# Patient Record
Sex: FEMALE | Race: WHITE | NOT HISPANIC OR LATINO | Employment: OTHER | RURAL
[De-identification: names, ages, dates, MRNs, and addresses within clinical notes are randomized per-mention and may not be internally consistent; named-entity substitution may affect disease eponyms.]

---

## 2017-02-10 ENCOUNTER — OFFICE VISIT (OUTPATIENT)
Dept: RETAIL CLINIC | Facility: CLINIC | Age: 52
End: 2017-02-10

## 2017-02-10 VITALS
HEART RATE: 81 BPM | OXYGEN SATURATION: 99 % | WEIGHT: 120.2 LBS | HEIGHT: 65 IN | RESPIRATION RATE: 15 BRPM | BODY MASS INDEX: 20.03 KG/M2 | TEMPERATURE: 99 F

## 2017-02-10 DIAGNOSIS — J01.00 ACUTE NON-RECURRENT MAXILLARY SINUSITIS: Primary | ICD-10-CM

## 2017-02-10 LAB
EXPIRATION DATE: NORMAL
FLUAV AG NPH QL: NEGATIVE
FLUBV AG NPH QL: NEGATIVE
INTERNAL CONTROL: NORMAL
Lab: NORMAL

## 2017-02-10 PROCEDURE — 99213 OFFICE O/P EST LOW 20 MIN: CPT | Performed by: NURSE PRACTITIONER

## 2017-02-10 PROCEDURE — 87804 INFLUENZA ASSAY W/OPTIC: CPT | Performed by: NURSE PRACTITIONER

## 2017-02-10 RX ORDER — PREDNISONE 20 MG/1
20 TABLET ORAL DAILY
Qty: 3 TABLET | Refills: 0 | Status: SHIPPED | OUTPATIENT
Start: 2017-02-10 | End: 2017-02-13

## 2017-02-10 RX ORDER — DEXTROMETHORPHAN HYDROBROMIDE AND PROMETHAZINE HYDROCHLORIDE 15; 6.25 MG/5ML; MG/5ML
5 SYRUP ORAL 4 TIMES DAILY PRN
Qty: 100 ML | Refills: 0 | Status: SHIPPED | OUTPATIENT
Start: 2017-02-10 | End: 2017-02-15

## 2017-02-10 RX ORDER — ALBUTEROL SULFATE 90 UG/1
2 AEROSOL, METERED RESPIRATORY (INHALATION) EVERY 4 HOURS PRN
Qty: 1 INHALER | Refills: 0 | Status: SHIPPED | OUTPATIENT
Start: 2017-02-10 | End: 2018-03-30

## 2017-02-10 RX ORDER — AMOXICILLIN AND CLAVULANATE POTASSIUM 875; 125 MG/1; MG/1
1 TABLET, FILM COATED ORAL 2 TIMES DAILY
Qty: 14 TABLET | Refills: 0 | Status: SHIPPED | OUTPATIENT
Start: 2017-02-10 | End: 2017-02-17

## 2017-02-10 NOTE — PROGRESS NOTES
"Subjective   Kayce Wing is a 51 y.o. female.   Visit Vitals   • Pulse 81   • Temp 99 °F (37.2 °C) (Oral)   • Resp 15   • Ht 64.5\" (163.8 cm)   • Wt 120 lb 3.2 oz (54.5 kg)   • LMP 01/23/2017   • SpO2 99%   • BMI 20.31 kg/m2         Sinusitis   This is a new problem. The current episode started in the past 7 days. The problem has been gradually worsening since onset. Maximum temperature: subjective, did not take. Associated symptoms include congestion, coughing (productive, severe, chest hurts with cough), ear pain (pressure), headaches (mild), a hoarse voice, sinus pressure and a sore throat (mild). Pertinent negatives include no diaphoresis or neck pain.        The following portions of the patient's history were reviewed and updated as appropriate: allergies, current medications, past family history, past medical history, past social history, past surgical history and problem list.    Review of Systems   Constitutional: Positive for appetite change, fatigue and fever. Negative for diaphoresis.   HENT: Positive for congestion, ear pain (pressure), hoarse voice, postnasal drip, rhinorrhea, sinus pressure and sore throat (mild).    Respiratory: Positive for cough (productive, severe, chest hurts with cough).    Gastrointestinal: Negative for diarrhea, nausea and vomiting.   Musculoskeletal: Negative for neck pain.   Neurological: Positive for headaches (mild).     Neg for PMH of diabetes of HTN    Objective   Physical Exam   Constitutional: She appears well-developed and well-nourished. She has a sickly appearance.   HENT:   Head: Normocephalic and atraumatic.   Nose: Rhinorrhea present. Right sinus exhibits maxillary sinus tenderness. Right sinus exhibits no frontal sinus tenderness. Left sinus exhibits maxillary sinus tenderness. Left sinus exhibits no frontal sinus tenderness.   Mouth/Throat: Oropharynx is clear and moist and mucous membranes are normal.   Cardiovascular: Regular rhythm and normal heart sounds. "    Pulmonary/Chest: Effort normal. She has wheezes (trace). She has rhonchi. She has no rales.   Lymphadenopathy:     She has no cervical adenopathy.   Skin: Skin is warm and dry.       Assessment/Plan   Kayce was seen today for sinusitis.    Diagnoses and all orders for this visit:    Acute non-recurrent maxillary sinusitis  -     POC Influenza A / B    Other orders  -     amoxicillin-clavulanate (AUGMENTIN) 875-125 MG per tablet; Take 1 tablet by mouth 2 (Two) Times a Day for 7 days.  -     predniSONE (DELTASONE) 20 MG tablet; Take 1 tablet by mouth Daily for 3 days.  -     albuterol (PROVENTIL HFA;VENTOLIN HFA) 108 (90 BASE) MCG/ACT inhaler; Inhale 2 puffs Every 4 (Four) Hours As Needed for wheezing or shortness of air.  -     promethazine-dextromethorphan (PROMETHAZINE-DM) 6.25-15 MG/5ML syrup; Take 5 mL by mouth 4 (Four) Times a Day As Needed for cough for up to 5 days.        Results for orders placed or performed in visit on 02/10/17   POC Influenza A / B   Result Value Ref Range    Rapid Influenza A Ag NEGATIVE     Rapid Influenza B Ag NEGATIVE     Internal Control Passed Passed    Lot Number [id number]     Expiration Date 4/2018

## 2017-02-10 NOTE — PATIENT INSTRUCTIONS

## 2017-07-21 ENCOUNTER — OFFICE VISIT (OUTPATIENT)
Dept: RETAIL CLINIC | Facility: CLINIC | Age: 52
End: 2017-07-21

## 2017-07-21 VITALS
WEIGHT: 114.2 LBS | BODY MASS INDEX: 19.03 KG/M2 | RESPIRATION RATE: 14 BRPM | HEIGHT: 65 IN | TEMPERATURE: 98.8 F | OXYGEN SATURATION: 98 % | HEART RATE: 59 BPM

## 2017-07-21 DIAGNOSIS — R30.0 DYSURIA: Primary | ICD-10-CM

## 2017-07-21 DIAGNOSIS — N30.01 ACUTE CYSTITIS WITH HEMATURIA: ICD-10-CM

## 2017-07-21 LAB
BILIRUB BLD-MCNC: NEGATIVE MG/DL
CLARITY, POC: CLEAR
COLOR UR: YELLOW
GLUCOSE UR STRIP-MCNC: NEGATIVE MG/DL
KETONES UR QL: NEGATIVE
LEUKOCYTE EST, POC: NEGATIVE
NITRITE UR-MCNC: NEGATIVE MG/ML
PH UR: 6 [PH] (ref 5–8)
PROT UR STRIP-MCNC: NEGATIVE MG/DL
RBC # UR STRIP: ABNORMAL /UL
SP GR UR: 1.01 (ref 1–1.03)
UROBILINOGEN UR QL: NORMAL

## 2017-07-21 PROCEDURE — 99213 OFFICE O/P EST LOW 20 MIN: CPT | Performed by: NURSE PRACTITIONER

## 2017-07-21 PROCEDURE — 81003 URINALYSIS AUTO W/O SCOPE: CPT | Performed by: NURSE PRACTITIONER

## 2017-07-21 RX ORDER — SULFAMETHOXAZOLE AND TRIMETHOPRIM 800; 160 MG/1; MG/1
1 TABLET ORAL 2 TIMES DAILY
Qty: 6 TABLET | Refills: 0 | Status: SHIPPED | OUTPATIENT
Start: 2017-07-21 | End: 2017-07-24

## 2017-07-21 RX ORDER — MULTIPLE VITAMINS W/ MINERALS TAB 9MG-400MCG
1 TAB ORAL DAILY
COMMUNITY
End: 2021-06-14

## 2017-07-21 RX ORDER — MELATONIN
1000 DAILY
COMMUNITY

## 2017-07-21 NOTE — PATIENT INSTRUCTIONS
Urinary Tract Infection, Adult  A urinary tract infection (UTI) is an infection of any part of the urinary tract, which includes the kidneys, ureters, bladder, and urethra. These organs make, store, and get rid of urine in the body. UTI can be a bladder infection (cystitis) or kidney infection (pyelonephritis).  CAUSES  This infection may be caused by fungi, viruses, or bacteria. Bacteria are the most common cause of UTIs. This condition can also be caused by repeated incomplete emptying of the bladder during urination.   RISK FACTORS  This condition is more likely to develop if:   · You ignore your need to urinate or hold urine for long periods of time.  · You do not empty your bladder completely during urination.  · You wipe back to front after urinating or having a bowel movement, if you are female.  · You are uncircumcised, if you are male.    · You are constipated.  · You have a urinary catheter that stays in place (indwelling).  · You have a weak defense (immune) system.  · You have a medical condition that affects your bowels, kidneys, or bladder.  · You have diabetes.  · You take antibiotic medicines frequently or for long periods of time, and the antibiotics no longer work well against certain types of infections (antibiotic resistance).  · You take medicines that irritate your urinary tract.  · You are exposed to chemicals that irritate your urinary tract.  · You are female.  SYMPTOMS   Symptoms of this condition include:   · Fever.    · Frequent urination or passing small amounts of urine frequently.    · Needing to urinate urgently.    · Pain or burning with urination.    · Urine that smells bad or unusual.    · Cloudy urine.    · Pain in the lower abdomen or back.    · Trouble urinating.    · Blood in the urine.    · Vomiting or being less hungry than normal.     · Diarrhea or abdominal pain.    · Vaginal discharge, if you are female.  DIAGNOSIS  This condition is diagnosed with a medical history and  physical exam. You will also need to provide a urine sample to test your urine. Other tests may be done, including:    · Blood tests.    · Sexually transmitted disease (STD) testing.     If you have had more than one UTI, a cystoscopy or imaging studies may be done to determine the cause of the infections.   TREATMENT   Treatment for this condition often includes a combination of two or more of the following:   · Antibiotic medicine.    · Other medicines to treat less common causes of UTI.    · Over-the-counter medicines to treat pain.    · Drinking enough water to stay hydrated.  HOME CARE INSTRUCTIONS  · Take over-the-counter and prescription medicines only as told by your health care provider.  · If you were prescribed an antibiotic, take it as told by your health care provider. Do not stop taking the antibiotic even if you start to feel better.  · Avoid alcohol, caffeine, tea, and carbonated beverages. They can irritate your bladder.  · Drink enough fluid to keep your urine clear or pale yellow.  · Keep all follow-up visits as told by your health care provider. This is important.  · Make sure to:    Empty your bladder often and completely. Do not hold urine for long periods of time.    Empty your bladder before and after sex.    Wipe from front to back after a bowel movement if you are female. Use each tissue one time when you wipe.  SEEK MEDICAL CARE IF:   · You have back pain.    · You have a fever.  · You feel nauseous or vomit.    · Your symptoms do not get better after 3 days.     · Your symptoms go away and then return.  SEEK IMMEDIATE MEDICAL CARE IF:   · You have severe back pain or lower abdominal pain.    · You are vomiting and cannot keep down any medicines or water.     This information is not intended to replace advice given to you by your health care provider. Make sure you discuss any questions you have with your health care provider.     Document Released: 09/27/2006 Document Revised: 04/10/2017  Document Reviewed: 11/07/2016  [employer] Interactive Patient Education ©2017 [employer]

## 2017-07-21 NOTE — PROGRESS NOTES
"Subjective   Kayce Wing is a 51 y.o. female.     History of Present Illness   Ms. Wing comes in with urinary frequency, no hematuria, starting to have some low back pain now but no fever, nausea or vomiting.  She does [facility] [employer] and so spends a lot of time in the water.  No recent diarrhea but did have frequent UTI's as a young adult.      The following portions of the patient's history were reviewed and updated as appropriate: allergies, current medications, past family history, past medical history, past social history, past surgical history and problem list.    Review of Systems   Constitutional: Negative for activity change, appetite change, chills, fatigue and fever.   Gastrointestinal: Negative for abdominal pain, constipation, diarrhea, nausea and vomiting.   Genitourinary: Positive for frequency.   Musculoskeletal: Back pain: mild low.   Psychiatric/Behavioral: Negative for sleep disturbance.       Objective   Physical Exam   Constitutional: She is oriented to person, place, and time. She appears well-developed and well-nourished. No distress.   HENT:   Head: Normocephalic and atraumatic.   Cardiovascular: Normal rate and regular rhythm.    Pulmonary/Chest: Effort normal and breath sounds normal.   Abdominal: Soft. Bowel sounds are normal. She exhibits no distension. There is tenderness (mild supra-pubic pressure, \"feels like I have to pee\").   Neurological: She is alert and oriented to person, place, and time.   Skin: Skin is warm and dry. She is not diaphoretic.   Psychiatric: She has a normal mood and affect. Her behavior is normal.   Nursing note and vitals reviewed.    Results for orders placed or performed in visit on 07/21/17   POC Urinalysis Dipstick, Automated   Result Value Ref Range    Color Yellow Yellow, Straw, Dark Yellow, Genet    Clarity, UA Clear Clear    Glucose, UA Negative Negative, 1000 mg/dL (3+) mg/dL    Bilirubin Negative Negative    Ketones, UA Negative " Negative    Specific Gravity  1.010 1.005 - 1.030    Blood, UA Trace (A) Negative    pH, Urine 6.0 5.0 - 8.0    Protein, POC Negative Negative mg/dL    Urobilinogen, UA Normal Normal    Leukocytes Negative Negative    Nitrite, UA Negative Negative       Assessment/Plan   Kayce was seen today for urinary tract infection.    Diagnoses and all orders for this visit:    Dysuria  -     POC Urinalysis Dipstick, Automated  -     Urine Culture  -     sulfamethoxazole-trimethoprim (BACTRIM DS) 800-160 MG per tablet; Take 1 tablet by mouth 2 (Two) Times a Day for 3 days.    Acute cystitis with hematuria  -     sulfamethoxazole-trimethoprim (BACTRIM DS) 800-160 MG per tablet; Take 1 tablet by mouth 2 (Two) Times a Day for 3 days.  -     Urine Culture - to call with results if any problems.   -     See instuctions    Discharge instructions were reviewed including:  Medications and side effects  Reviewed [facility] Care Plan  To follow up with PCP if symptom are worse or persist  See patient instructions  Patient verbalized understanding and agreed with plan.  DAVE Allan

## 2017-07-23 LAB
BACTERIA UR CULT: NO GROWTH
BACTERIA UR CULT: NORMAL

## 2017-07-25 ENCOUNTER — TELEPHONE (OUTPATIENT)
Dept: RETAIL CLINIC | Facility: CLINIC | Age: 52
End: 2017-07-25

## 2017-07-25 NOTE — TELEPHONE ENCOUNTER
Spoke with pt and informed urine culture was negative.  She reports all symptoms have resolved and denies further questions.

## 2018-03-30 ENCOUNTER — OFFICE VISIT (OUTPATIENT)
Dept: RETAIL CLINIC | Facility: CLINIC | Age: 53
End: 2018-03-30

## 2018-03-30 VITALS
WEIGHT: 120.8 LBS | OXYGEN SATURATION: 99 % | RESPIRATION RATE: 15 BRPM | HEIGHT: 65 IN | HEART RATE: 67 BPM | TEMPERATURE: 97 F | BODY MASS INDEX: 20.12 KG/M2

## 2018-03-30 DIAGNOSIS — J40 BRONCHITIS: Primary | ICD-10-CM

## 2018-03-30 PROCEDURE — 99213 OFFICE O/P EST LOW 20 MIN: CPT | Performed by: NURSE PRACTITIONER

## 2018-03-30 RX ORDER — CEPHALEXIN 500 MG/1
500 CAPSULE ORAL 2 TIMES DAILY
Qty: 20 CAPSULE | Refills: 0 | Status: SHIPPED | OUTPATIENT
Start: 2018-03-30 | End: 2018-04-09

## 2018-03-30 NOTE — PROGRESS NOTES
Chief Complaint  Chief Complaint   Patient presents with   • URI       Subjective   Kayce Wing is a 52 y.o. female.     Pt presents with c/o congestion for 1 week which has gotten worse with increased short of breath, wheezing and some mucus production.  Also c/o cut to left index finger with kitchen knife yesterday and today has mild swelling around it and increased redness.        URI    This is a new problem. The current episode started in the past 7 days. The problem has been gradually worsening. Maximum temperature: has not taken but states felt warm. Associated symptoms include chest pain (c/o pulled muscle due to lifting something), congestion, coughing, headaches, rhinorrhea and wheezing. Pertinent negatives include no ear pain, nausea, sinus pain, sore throat or vomiting. She has tried acetaminophen for the symptoms.        Current Outpatient Prescriptions on File Prior to Visit   Medication Sig Dispense Refill   • cholecalciferol (VITAMIN D3) 1000 UNITS tablet Take 1,000 Units by mouth Daily.     • Multiple Vitamins-Minerals (MULTIVITAMIN WITH MINERALS) tablet tablet Take 1 tablet by mouth Daily.     • [DISCONTINUED] albuterol (PROVENTIL HFA;VENTOLIN HFA) 108 (90 BASE) MCG/ACT inhaler Inhale 2 puffs Every 4 (Four) Hours As Needed for wheezing or shortness of air. 1 inhaler 0     No current facility-administered medications on file prior to visit.        Allergies   Allergen Reactions   • Antihistamines, Chlorpheniramine-Type Dizziness       Past Medical History:   Diagnosis Date   • Arthritis     seasoanl allergies   • Asthma    • MVP (mitral valve prolapse)        Past Surgical History:   Procedure Laterality Date   • DILATION AND CURETTAGE, DIAGNOSTIC / THERAPEUTIC  05/2016    DUB   • MOUTH SURGERY         Family History   Problem Relation Age of Onset   • No Known Problems Mother    • Diabetes Father    • Parkinsonism Father 76   • Diabetes Maternal Grandmother    • Diabetes Maternal Grandfather    •  "Diabetes Paternal Grandmother    • Diabetes Paternal Grandfather        Social History     Social History   • Marital status: Single     Spouse name: N/A   • Number of children: N/A   • Years of education: N/A     Occupational History   • Not on file.     Social History Main Topics   • Smoking status: Never Smoker   • Smokeless tobacco: Not on file   • Alcohol use Not on file   • Drug use: Unknown   • Sexual activity: Not on file     Other Topics Concern   • Not on file     Social History Narrative   • No narrative on file       Review of Systems   Constitutional: Positive for activity change, chills, fatigue and fever (low grade).   HENT: Positive for congestion, postnasal drip and rhinorrhea. Negative for ear pain, facial swelling, sinus pain and sore throat.    Respiratory: Positive for cough, chest tightness, shortness of breath and wheezing.    Cardiovascular: Positive for chest pain (c/o pulled muscle due to lifting something).   Gastrointestinal: Negative for nausea and vomiting.   Musculoskeletal: Positive for back pain.   Allergic/Immunologic: Positive for environmental allergies.   Neurological: Positive for dizziness and headaches.       Pulse 67   Temp 97 °F (36.1 °C) (Temporal Artery )   Resp 15   Ht 163.8 cm (64.5\")   Wt 54.8 kg (120 lb 12.8 oz)   LMP 03/18/2018   SpO2 99%   BMI 20.42 kg/m²     Objective   Physical Exam   Constitutional: She is oriented to person, place, and time. Vital signs are normal. She appears well-nourished. She is cooperative. No distress.   HENT:   Head: Normocephalic and atraumatic.   Right Ear: Tympanic membrane normal.   Left Ear: Tympanic membrane normal.   Nose: Rhinorrhea present.   Mouth/Throat: Uvula is midline, oropharynx is clear and moist and mucous membranes are normal.   Neck: Trachea normal, normal range of motion and phonation normal. Neck supple.   Cardiovascular: Normal rate and regular rhythm.    Pulmonary/Chest: Effort normal. No respiratory distress. " "She has wheezes in the left upper field. She has rhonchi in the right lower field and the left lower field. She has no rales.   Lymphadenopathy:     She has no cervical adenopathy.   Neurological: She is alert and oriented to person, place, and time.   Skin: Skin is warm and dry.   approx 1\" laceration noted to left index finger with good approximation, mild redness surrounding laceration, no drainage or redness extending from site.  No tenderness with palpation.        Results for orders placed or performed in visit on 07/21/17   Urine Culture   Result Value Ref Range    Urine Culture Final report     Result 1 No growth    POC Urinalysis Dipstick, Automated   Result Value Ref Range    Color Yellow Yellow, Straw, Dark Yellow, Genet    Clarity, UA Clear Clear    Glucose, UA Negative Negative, 1000 mg/dL (3+) mg/dL    Bilirubin Negative Negative    Ketones, UA Negative Negative    Specific Gravity  1.010 1.005 - 1.030    Blood, UA Trace (A) Negative    pH, Urine 6.0 5.0 - 8.0    Protein, POC Negative Negative mg/dL    Urobilinogen, UA Normal Normal    Leukocytes Negative Negative    Nitrite, UA Negative Negative         Assessment/Plan   Kayce was seen today for uri.    Diagnoses and all orders for this visit:    Bronchitis    Other orders  -     cephalexin (KEFLEX) 500 MG capsule; Take 1 capsule by mouth 2 (Two) Times a Day for 10 days.          DAVE Coleman    "

## 2018-03-30 NOTE — PATIENT INSTRUCTIONS
Acute Bronchitis, Adult  Acute bronchitis is when air tubes (bronchi) in the lungs suddenly get swollen. The condition can make it hard to breathe. It can also cause these symptoms:  · A cough.  · Coughing up clear, yellow, or green mucus.  · Wheezing.  · Chest congestion.  · Shortness of breath.  · A fever.  · Body aches.  · Chills.  · A sore throat.  Follow these instructions at home:  Medicines   · Take over-the-counter and prescription medicines only as told by your doctor.  · If you were prescribed an antibiotic medicine, take it as told by your doctor. Do not stop taking the antibiotic even if you start to feel better.  General instructions   · Rest.  · Drink enough fluids to keep your pee (urine) clear or pale yellow.  · Avoid smoking and secondhand smoke. If you smoke and you need help quitting, ask your doctor. Quitting will help your lungs heal faster.  · Use an inhaler, cool mist vaporizer, or humidifier as told by your doctor.  · Keep all follow-up visits as told by your doctor. This is important.  How is this prevented?  To lower your risk of getting this condition again:  · Wash your hands often with soap and water. If you cannot use soap and water, use hand .  · Avoid contact with people who have cold symptoms.  · Try not to touch your hands to your mouth, nose, or eyes.  · Make sure to get the flu shot every year.  Contact a doctor if:  · Your symptoms do not get better in 2 weeks.  Get help right away if:  · You cough up blood.  · You have chest pain.  · You have very bad shortness of breath.  · You become dehydrated.  · You faint (pass out) or keep feeling like you are going to pass out.  · You keep throwing up (vomiting).  · You have a very bad headache.  · Your fever or chills gets worse.  This information is not intended to replace advice given to you by your health care provider. Make sure you discuss any questions you have with your health care provider.  Document Released: 06/05/2009  Document Revised: 07/26/2017 Document Reviewed: 06/07/2017  [employer] Interactive Patient Education © 2017 [employer]

## 2018-06-04 ENCOUNTER — LAB REQUISITION (OUTPATIENT)
Dept: LAB | Facility: HOSPITAL | Age: 53
End: 2018-06-04

## 2018-06-04 DIAGNOSIS — Z00.00 ROUTINE GENERAL MEDICAL EXAMINATION AT A HEALTH CARE FACILITY: ICD-10-CM

## 2018-06-04 PROCEDURE — 36415 COLL VENOUS BLD VENIPUNCTURE: CPT | Performed by: OBSTETRICS & GYNECOLOGY

## 2018-08-13 ENCOUNTER — TRANSCRIBE ORDERS (OUTPATIENT)
Dept: ADMINISTRATIVE | Facility: HOSPITAL | Age: 53
End: 2018-08-13

## 2018-08-13 DIAGNOSIS — Z12.31 VISIT FOR SCREENING MAMMOGRAM: Primary | ICD-10-CM

## 2018-09-17 ENCOUNTER — HOSPITAL ENCOUNTER (OUTPATIENT)
Dept: MAMMOGRAPHY | Facility: HOSPITAL | Age: 53
Discharge: HOME | End: 2018-09-17
Attending: OBSTETRICS & GYNECOLOGY

## 2018-09-17 DIAGNOSIS — Z12.31 VISIT FOR SCREENING MAMMOGRAM: ICD-10-CM

## 2019-04-29 ENCOUNTER — OFFICE VISIT (OUTPATIENT)
Dept: RETAIL CLINIC | Facility: CLINIC | Age: 54
End: 2019-04-29

## 2019-04-29 VITALS
DIASTOLIC BLOOD PRESSURE: 80 MMHG | OXYGEN SATURATION: 98 % | SYSTOLIC BLOOD PRESSURE: 118 MMHG | WEIGHT: 120 LBS | BODY MASS INDEX: 19.99 KG/M2 | HEART RATE: 65 BPM | HEIGHT: 65 IN | RESPIRATION RATE: 12 BRPM | TEMPERATURE: 98.3 F

## 2019-04-29 DIAGNOSIS — R05.9 COUGHING: ICD-10-CM

## 2019-04-29 DIAGNOSIS — J01.41 ACUTE RECURRENT PANSINUSITIS: Primary | ICD-10-CM

## 2019-04-29 PROCEDURE — 99213 OFFICE O/P EST LOW 20 MIN: CPT | Performed by: NURSE PRACTITIONER

## 2019-04-29 RX ORDER — PREDNISONE 10 MG/1
TABLET ORAL DAILY
Qty: 21 EACH | Refills: 0 | Status: SHIPPED | OUTPATIENT
Start: 2019-04-29 | End: 2019-05-05

## 2019-04-29 RX ORDER — TIZANIDINE HYDROCHLORIDE 2 MG/1
CAPSULE, GELATIN COATED ORAL
Refills: 1 | COMMUNITY
Start: 2019-04-08 | End: 2021-06-14

## 2019-04-29 RX ORDER — PSEUDOEPHEDRINE HCL 120 MG/1
120 TABLET, FILM COATED, EXTENDED RELEASE ORAL EVERY 12 HOURS
Qty: 20 TABLET | Refills: 0 | Status: SHIPPED | OUTPATIENT
Start: 2019-04-29 | End: 2019-05-09

## 2019-04-29 RX ORDER — DEXTROMETHORPHAN HYDROBROMIDE AND PROMETHAZINE HYDROCHLORIDE 15; 6.25 MG/5ML; MG/5ML
5 SYRUP ORAL 4 TIMES DAILY PRN
Qty: 240 ML | Refills: 0 | Status: SHIPPED | OUTPATIENT
Start: 2019-04-29 | End: 2019-05-09

## 2019-04-29 RX ORDER — IBUPROFEN 800 MG/1
800 TABLET ORAL EVERY 8 HOURS PRN
Qty: 90 TABLET | Refills: 0 | Status: SHIPPED | OUTPATIENT
Start: 2019-04-29

## 2019-04-29 RX ORDER — AMOXICILLIN AND CLAVULANATE POTASSIUM 875; 125 MG/1; MG/1
1 TABLET, FILM COATED ORAL 2 TIMES DAILY
Qty: 20 TABLET | Refills: 0 | Status: SHIPPED | OUTPATIENT
Start: 2019-04-29 | End: 2021-06-14

## 2019-04-29 NOTE — PROGRESS NOTES
"Subjective   Kayce Wing is a 53 y.o. female.     Sinus Problem   This is a new problem. Episode onset: 3 weeks. The problem has been gradually worsening since onset. There has been no fever. The pain is severe. Associated symptoms include congestion, coughing (severe, persistent), headaches, a hoarse voice, sinus pressure, a sore throat and swollen glands. Pertinent negatives include no chills, ear pain, neck pain, shortness of breath or sneezing. Treatments tried: allergy medications. The treatment provided no relief.        The following portions of the patient's history were reviewed and updated as appropriate: allergies, current medications, past medical history, past social history, past surgical history and problem list.    Review of Systems   Constitutional: Negative for appetite change, chills and fever.   HENT: Positive for congestion, hoarse voice, postnasal drip, rhinorrhea, sinus pressure, sinus pain and sore throat. Negative for ear pain, sneezing and trouble swallowing.    Eyes: Negative.    Respiratory: Positive for cough (severe, persistent) and chest tightness. Negative for shortness of breath and wheezing.    Cardiovascular: Negative.    Gastrointestinal: Negative for abdominal pain, diarrhea, nausea and vomiting.   Musculoskeletal: Negative.  Negative for neck pain.   Skin: Negative.    Neurological: Positive for headaches. Negative for dizziness.   Hematological: Positive for adenopathy.        /80   Pulse 65   Temp 98.3 °F (36.8 °C)   Resp 12   Ht 163.8 cm (64.5\")   Wt 54.4 kg (120 lb)   SpO2 98%   BMI 20.28 kg/m²      Objective   Physical Exam   Constitutional: She is oriented to person, place, and time. Vital signs are normal. She appears well-developed and well-nourished.   HENT:   Head: Normocephalic.   Right Ear: External ear and ear canal normal. No drainage, swelling or tenderness. Tympanic membrane is bulging. Tympanic membrane is not erythematous.   Left Ear: External " ear and ear canal normal. No drainage, swelling or tenderness. Tympanic membrane is bulging. Tympanic membrane is not erythematous.   Nose: Mucosal edema and rhinorrhea (thick, yellowish) present. Right sinus exhibits maxillary sinus tenderness (severe) and frontal sinus tenderness. Left sinus exhibits maxillary sinus tenderness (severe, left > right) and frontal sinus tenderness.   Mouth/Throat: Uvula is midline, oropharynx is clear and moist and mucous membranes are normal. Tonsils are 0 on the right. Tonsils are 0 on the left. No tonsillar exudate.   Eyes: Conjunctivae are normal. Pupils are equal, round, and reactive to light.   Cardiovascular: Normal rate, regular rhythm, S1 normal, S2 normal and normal heart sounds.   Pulmonary/Chest: Effort normal and breath sounds normal. No respiratory distress. She has no wheezes.   Lymphadenopathy:        Head (right side): Tonsillar adenopathy present.        Head (left side): Tonsillar adenopathy present.     She has no cervical adenopathy.   Neurological: She is alert and oriented to person, place, and time.   Skin: Skin is warm, dry and intact. No rash noted.   Psychiatric: She has a normal mood and affect. Her speech is normal and behavior is normal. Thought content normal.   Vitals reviewed.       Assessment/Plan   Kayce was seen today for sinus problem.    Diagnoses and all orders for this visit:    Acute recurrent pansinusitis  -     amoxicillin-clavulanate (AUGMENTIN) 875-125 MG per tablet; Take 1 tablet by mouth 2 (Two) Times a Day.  -     pseudoephedrine (SUDAFED) 120 MG 12 hr tablet; Take 1 tablet by mouth Every 12 (Twelve) Hours for 10 days.  -     predniSONE (DELTASONE) 10 MG (21) tablet pack; Take  by mouth Daily for 6 days. Use as directed on package  -     ibuprofen (ADVIL,MOTRIN) 800 MG tablet; Take 1 tablet by mouth Every 8 (Eight) Hours As Needed for Mild Pain .    Coughing  -     predniSONE (DELTASONE) 10 MG (21) tablet pack; Take  by mouth Daily for 6  days. Use as directed on package  -     promethazine-dextromethorphan (PROMETHAZINE-DM) 6.25-15 MG/5ML syrup; Take 5 mL by mouth 4 (Four) Times a Day As Needed for Cough for up to 10 days.

## 2019-05-26 DIAGNOSIS — J01.41 ACUTE RECURRENT PANSINUSITIS: ICD-10-CM

## 2019-05-27 RX ORDER — IBUPROFEN 800 MG/1
800 TABLET ORAL EVERY 8 HOURS PRN
Qty: 90 TABLET | Refills: 0 | OUTPATIENT
Start: 2019-05-27

## 2020-10-15 DIAGNOSIS — R92.8 ABNORMAL MAMMOGRAM: Primary | ICD-10-CM

## 2020-10-22 DIAGNOSIS — R92.8 ABNORMAL MAMMOGRAM: ICD-10-CM

## 2021-06-14 ENCOUNTER — OFFICE VISIT (OUTPATIENT)
Dept: OBSTETRICS AND GYNECOLOGY | Facility: CLINIC | Age: 56
End: 2021-06-14

## 2021-06-14 VITALS
SYSTOLIC BLOOD PRESSURE: 112 MMHG | BODY MASS INDEX: 19.19 KG/M2 | DIASTOLIC BLOOD PRESSURE: 68 MMHG | HEIGHT: 65 IN | WEIGHT: 115.2 LBS

## 2021-06-14 DIAGNOSIS — K59.00 CONSTIPATION, UNSPECIFIED CONSTIPATION TYPE: ICD-10-CM

## 2021-06-14 DIAGNOSIS — R10.2 PELVIC PRESSURE IN FEMALE: ICD-10-CM

## 2021-06-14 DIAGNOSIS — Z01.419 ENCOUNTER FOR GYNECOLOGICAL EXAMINATION: Primary | ICD-10-CM

## 2021-06-14 DIAGNOSIS — R14.0 ABDOMINAL BLOATING: ICD-10-CM

## 2021-06-14 DIAGNOSIS — Z12.31 BREAST CANCER SCREENING BY MAMMOGRAM: ICD-10-CM

## 2021-06-14 PROCEDURE — 99213 OFFICE O/P EST LOW 20 MIN: CPT | Performed by: OBSTETRICS & GYNECOLOGY

## 2021-06-14 PROCEDURE — 99396 PREV VISIT EST AGE 40-64: CPT | Performed by: OBSTETRICS & GYNECOLOGY

## 2021-06-14 NOTE — PATIENT INSTRUCTIONS
Breast Self-Awareness  Breast self-awareness is knowing how your breasts look and feel. Doing breast self-awareness is important. It allows you to catch a breast problem early while it is still small and can be treated. All women should do breast self-awareness, including women who have had breast implants. Tell your doctor if you notice a change in your breasts.  What you need:  · A mirror.  · A well-lit room.  How to do a breast self-exam  A breast self-exam is one way to learn what is normal for your breasts and to check for changes. To do a breast self-exam:  Look for changes    1. Take off all the clothes above your waist.  2.  front of a mirror in a room with good lighting.  3. Put your hands on your hips.  4. Push your hands down.  5. Look at your breasts and nipples in the mirror to see if one breast or nipple looks different from the other. Check to see if:  ? The shape of one breast is different.  ? The size of one breast is different.  ? There are wrinkles, dips, and bumps in one breast and not the other.  6. Look at each breast for changes in the skin, such as:  ? Redness.  ? Scaly areas.  7. Look for changes in your nipples, such as:  ? Liquid around the nipples.  ? Bleeding.  ? Dimpling.  ? Redness.  ? A change in where the nipples are.  Feel for changes    1. Lie on your back on the floor.  2. Feel each breast. To do this, follow these steps:  ? Pick a breast to feel.  ? Put the arm closest to that breast above your head.  ? Use your other arm to feel the nipple area of your breast. Feel the area with the pads of your three middle fingers by making small circles with your fingers. For the first [place], press lightly. For the second [place], press harder. For the third [place], press even harder.  ? Keep making circles with your fingers at the different pressures as you move down your breast. Stop when you feel your ribs.  ? Move your fingers a little toward the center of your body.  ? Start  making circles with your fingers again, this time going up until you reach your collarbone.  ? Keep making up-and-down circles until you reach your armpit. Remember to keep using the three pressures.  ? Feel the other breast in the same way.  3. Sit or  the tub or shower.  4. With soapy water on your skin, feel each breast the same way you did in step 2 when you were lying on the floor.  Write down what you find  Writing down what you find can help you remember what to tell your doctor. Write down:  · What is normal for each breast.  · Any changes you find in each breast, including:  ? The kind of changes you find.  ? Whether you have pain.  ? Size and location of any lumps.  · When you last had your menstrual period.  General tips  · Check your breasts every month.  · If you are breastfeeding, the best time to check your breasts is after you feed your baby or after you use a breast pump.  · If you get menstrual periods, the best time to check your breasts is 5-7 days after your menstrual period is over.  · With time, you will become comfortable with the self-exam, and you will begin to know if there are changes in your breasts.  Contact a doctor if you:  · See a change in the shape or size of your breasts or nipples.  · See a change in the skin of your breast or nipples, such as red or scaly skin.  · Have fluid coming from your nipples that is not normal.  · Find a lump or thick area that was not there before.  · Have pain in your breasts.  · Have any concerns about your breast health.  Summary  · Breast self-awareness includes looking for changes in your breasts, as well as feeling for changes within your breasts.  · Breast self-awareness should be done in front of a mirror in a well-lit room.  · You should check your breasts every month. If you get menstrual periods, the best time to check your breasts is 5-7 days after your menstrual period is over.  · Let your doctor know of any changes you see in your  breasts, including changes in size, changes on the skin, pain or tenderness, or fluid from your nipples that is not normal.  This information is not intended to replace advice given to you by your health care provider. Make sure you discuss any questions you have with your health care provider.  Document Revised: 08/06/2019 Document Reviewed: 08/06/2019  Elsevier Patient Education © 2021 [employer]

## 2021-06-14 NOTE — PROGRESS NOTES
GYN Annual Exam     CC - Here for Annual Exam.     Subjective     HPI  Kayce Wing is a 55 y.o. female, , who presents for annual well woman exam.  She is postmenopausal.  Patient reports problems with: bloating, indigestion, constipation, pelvic pressure and a thick white discharge without an odor.  Partner Status: Marital Status: single.  New Partners since last visit: no.   She Denies vasomotor symptoms.  She denies  issues with urinary incontinence.     The patient does have complaints today of bloating and pelvic pressure.    LMP was 2020    She exercises regularly: yes.  She has concerns about domestic violence: no.        Additional OB/GYN History       Last Pap : 2018 normal per pt  Last Completed Pap Smear     This patient has no relevant Health Maintenance data.        History of abnormal Pap smear: no  Family history of uterine, colon, breast, or ovarian cancer: yes - maternal great aunt had ovarian cancer  Performs monthly Self-Breast Exam: yes  Last mammogram:   Last Completed Mammogram          Ordered - MAMMOGRAM (Every 2 Years) Ordered on 2021  SCANNED - MAMMO    10/22/2020  SCANNED - MAMMO    10/05/2020  SCANNED - MAMMO    2016  Mammo screening bilateral w CAD              Last colonoscopy:  polyps   Last Completed Colonoscopy     This patient has no relevant Health Maintenance data.        Last DEXA: never   Exercises Regularly: yes  Feelings of Anxiety or Depression: no    Tobacco Usage?: No   OB History        0    Para   0    Term   0       0    AB   0    Living   0       SAB   0    TAB   0    Ectopic   0    Molar        Multiple   0    Live Births                    Health Maintenance   Topic Date Due   • COLORECTAL CANCER SCREENING  Never done   • ANNUAL PHYSICAL  Never done   • TDAP/TD VACCINES (1 - Tdap) Never done   • ZOSTER VACCINE (1 of 2) Never done   • HEPATITIS C SCREENING  Never done   • PAP SMEAR  2021   • INFLUENZA  "VACCINE  08/01/2021   • Annual Gynecologic Pelvic and Breast Exam  06/15/2022   • MAMMOGRAM  04/27/2023   • COVID-19 Vaccine  Completed   • Pneumococcal Vaccine 0-64  Aged Out       The additional following portions of the patient's history were reviewed and updated as appropriate: allergies, current medications, past family history, past medical history, past social history, past surgical history and problem list.    Past Medical History:   Diagnosis Date   • Arthritis     seasoanl allergies   • Asthma    • MVP (mitral valve prolapse)         Past Surgical History:   Procedure Laterality Date   • DILATION AND CURETTAGE, DIAGNOSTIC / THERAPEUTIC  05/2016    DUB   • MOUTH SURGERY     • WISDOM TOOTH EXTRACTION          Review of Systems   All other systems reviewed and are negative.      I have reviewed and agree with the HPI, ROS, and historical information as entered above. Georgina House MD    Objective   /68   Ht 163.8 cm (64.5\")   Wt 52.3 kg (115 lb 3.2 oz)   LMP 03/18/2018   Breastfeeding No   BMI 19.47 kg/m²     Physical Exam  Vitals and nursing note reviewed. Exam conducted with a chaperone present.   Constitutional:       General: She is awake.   HENT:      Head: Normocephalic and atraumatic.   Neck:      Thyroid: No thyroid mass, thyromegaly or thyroid tenderness.   Cardiovascular:      Rate and Rhythm: Normal rate.      Heart sounds: No murmur heard.   No friction rub. No gallop.    Pulmonary:      Effort: Pulmonary effort is normal.      Breath sounds: Normal breath sounds. No stridor. No wheezing, rhonchi or rales.   Chest:      Chest wall: No mass or tenderness.      Breasts:         Right: Normal. No bleeding, inverted nipple, mass, nipple discharge, skin change or tenderness.         Left: Mass (2 cm mass at 1:00, 3 cm from nipple, smooth, mobile, nttp) present. No bleeding, inverted nipple, nipple discharge, skin change or tenderness.       Abdominal:      Palpations: Abdomen is soft.      " Tenderness: There is no guarding or rebound.      Hernia: There is no hernia in the left inguinal area or right inguinal area.   Genitourinary:     General: Normal vulva.      Pubic Area: No rash.       Labia:         Right: No rash, tenderness, lesion or injury.         Left: No rash, tenderness, lesion or injury.       Urethra: No prolapse, urethral swelling or urethral lesion.      Vagina: No foreign body. No vaginal discharge, erythema, tenderness, bleeding or lesions.      Cervix: No cervical motion tenderness, discharge, friability, lesion, erythema or cervical bleeding.      Uterus: Normal. Not deviated, not enlarged, not fixed and not tender.       Adnexa: Right adnexa normal and left adnexa normal.        Right: No mass, tenderness or fullness.          Left: No mass, tenderness or fullness.        Rectum: No external hemorrhoid.   Musculoskeletal:      Cervical back: Normal range of motion.   Lymphadenopathy:      Upper Body:      Right upper body: No supraclavicular or axillary adenopathy.      Left upper body: No supraclavicular or axillary adenopathy.   Skin:     General: Skin is warm.   Neurological:      Mental Status: She is alert and oriented to person, place, and time.   Psychiatric:         Mood and Affect: Mood and affect normal.         Speech: Speech normal.         Assessment/Plan     Assessment     Problem List Items Addressed This Visit     None      Visit Diagnoses     Encounter for gynecological examination    -  Primary    Relevant Orders    Pap IG, HPV-hr    Breast cancer screening by mammogram        Relevant Orders    Mammo Screening Digital Tomosynthesis Bilateral With CAD    Abdominal bloating        Relevant Orders    US Non-ob Transvaginal    Constipation, unspecified constipation type        Pelvic pressure in female        Relevant Orders    US Non-ob Transvaginal          Plan     1. Reviewed monthly self breast exams.  Instructed to call with lumps, pain, or breast discharge.   Yearly mammograms ordered.  2. Ordered mammogram today.  3. Colonoscopy recommended.  4. RTC in 1 year or PRN with problems.  5. Colonoscopy due this year, has this scheduled in the fall  6. Patient reports recent pelvic pressure, bloating and constipation.  She has been taking MiraLAX every other day and this helps some.  She has known history of prior large ovarian cyst and complains today of vaginal pressure as well as rectal pressure.  She underwent transvaginal ultrasound which revealed a normal uterus and right ovary.  The left ovary was noted to have a 1.8 x 0.9 x 1.3 cm simple cyst.  We discussed that this was likely a GI issue.  I encouraged her to take MiraLAX daily and see if this helps alleviate the symptoms as well as increasing water intake.  We also discussed other over-the-counter methods to help with this.  If not improved the patient plans to call her gastroenterologist and move her colonoscopy up.  I have also encouraged her if her symptoms do not resolve she can always follow-up for repeat ultrasound to follow the simple cyst or if she begins having more pelvic discomfort.  7. Patient with known cyst in her left breast and mass was palpated today on exam.  Patient states that this is consistent with the known mass that is previously been evaluated by mammography.  She was told this was a benign cyst and no further imaging or evaluation was warranted.  Patient states this feels stable to her without change.    Georgina House MD  06/14/2021

## 2021-06-17 DIAGNOSIS — Z01.419 ENCOUNTER FOR GYNECOLOGICAL EXAMINATION: ICD-10-CM

## 2023-08-07 ENCOUNTER — OFFICE VISIT (OUTPATIENT)
Dept: OBSTETRICS AND GYNECOLOGY | Facility: CLINIC | Age: 58
End: 2023-08-07
Payer: COMMERCIAL

## 2023-08-07 VITALS
WEIGHT: 126.4 LBS | HEIGHT: 65 IN | SYSTOLIC BLOOD PRESSURE: 118 MMHG | DIASTOLIC BLOOD PRESSURE: 68 MMHG | BODY MASS INDEX: 21.06 KG/M2

## 2023-08-07 DIAGNOSIS — R35.0 URINARY FREQUENCY: ICD-10-CM

## 2023-08-07 DIAGNOSIS — Z01.419 WOMEN'S ANNUAL ROUTINE GYNECOLOGICAL EXAMINATION: Primary | ICD-10-CM

## 2023-08-07 DIAGNOSIS — Z12.31 BREAST CANCER SCREENING BY MAMMOGRAM: ICD-10-CM

## 2023-08-07 DIAGNOSIS — N95.0 PMB (POSTMENOPAUSAL BLEEDING): ICD-10-CM

## 2023-08-07 DIAGNOSIS — Z13.820 OSTEOPOROSIS SCREENING: ICD-10-CM

## 2023-08-07 LAB
BILIRUB BLD-MCNC: NEGATIVE MG/DL
CLARITY, POC: CLEAR
COLOR UR: YELLOW
GLUCOSE UR STRIP-MCNC: NEGATIVE MG/DL
KETONES UR QL: NEGATIVE
LEUKOCYTE EST, POC: NEGATIVE
NITRITE UR-MCNC: NEGATIVE MG/ML
PH UR: 6 [PH] (ref 5–8)
PROT UR STRIP-MCNC: NEGATIVE MG/DL
RBC # UR STRIP: NEGATIVE /UL
SP GR UR: 1.01 (ref 1–1.03)
UROBILINOGEN UR QL: NORMAL

## 2023-08-07 NOTE — PROGRESS NOTES
Gynecologic Annual Exam Note        CC - Here for Annual Exam.     Subjective     HPI  Kayce Wing is a 57 y.o. female, , who presents for annual well woman exam.  She is postmenopausal.  Patient reports problems with:   urinary frequency, lower pelvic pressure, occasional hot flashes, occasional diarrhea but was diagnosed with gastritis, occasional spotting when wiping that lasts for one day and then she will go months without any spotting .  Partner Status: Marital Status: single.  New Partners since last visit: no.   She reports vasomotor symptoms.  She denies  issues with urinary incontinence.     The patient has complaints today. See above.    She has concerns about domestic violence: no.        Additional OB/GYN History     History of abnormal Pap smear: yes - neg, HPV non 16/18 +  Family history of uterine, colon, breast, or ovarian cancer: yes - maternal great aunt-ovarian cancer   Performs monthly Self-Breast Exam: no  Exercises Regularly: yes  Feelings of Anxiety or Depression: no    Last Pap : 21-negative, HPV non 16/18 +  Last Completed Pap Smear       This patient has no relevant Health Maintenance data.          Last mammogram: 10/31/22- [facility] at [facility]   Last Completed Mammogram            Ordered - MAMMOGRAM (Every 2 Years) Ordered on 2023      10/31/2022  Mammo Screening Digital Tomosynthesis Bilateral With CAD    10/28/2021  Mammo Screening Digital Tomosynthesis Bilateral With CAD    2021  SCANNED - MAMMO    10/22/2020  SCANNED - MAMMO    10/05/2020  SCANNED - MAMMO    Only the first 5 history entries have been loaded, but more history exists.                  Last colonoscopy:   Last Completed Colonoscopy       This patient has no relevant Health Maintenance data.          Last DEXA: never- had one ordered but did not complete, had waited for along time in waiting room     Tobacco Usage?: No   OB History          0    Para   0    Term   0      "  0    AB   0    Living   0         SAB   0    IAB   0    Ectopic   0    Molar        Multiple   0    Live Births                    Health Maintenance   Topic Date Due    COLORECTAL CANCER SCREENING  Never done    TDAP/TD VACCINES (1 - Tdap) Never done    ZOSTER VACCINE (1 of 2) Never done    HEPATITIS C SCREENING  Never done    ANNUAL PHYSICAL  Never done    PAP SMEAR  04/23/2021    Annual Gynecologic Pelvic and Breast Exam  06/15/2022    INFLUENZA VACCINE  10/01/2023    MAMMOGRAM  10/31/2024    COVID-19 Vaccine  Completed    Pneumococcal Vaccine 0-64  Aged Out       The additional following portions of the patient's history were reviewed and updated as appropriate: allergies, current medications, past family history, past medical history, past social history, past surgical history, and problem list.    Past Medical History:   Diagnosis Date    Arthritis     seasoanl allergies    Asthma     Gastritis     MVP (mitral valve prolapse)         Past Surgical History:   Procedure Laterality Date    DILATION AND CURETTAGE, DIAGNOSTIC / THERAPEUTIC  05/2016    DUB    MOUTH SURGERY      WISDOM TOOTH EXTRACTION          Review of Systems   Constitutional: Negative.    HENT: Negative.     Eyes: Negative.    Respiratory: Negative.     Cardiovascular: Negative.    Gastrointestinal: Negative.  Positive for diarrhea.   Endocrine: Negative.    Genitourinary: Negative.  Positive for frequency.        Lower pelvic pressure   Musculoskeletal: Negative.    Skin: Negative.    Allergic/Immunologic: Negative.    Neurological: Negative.    Hematological: Negative.    Psychiatric/Behavioral: Negative.       I have reviewed and agree with the HPI, ROS, and historical information as entered above. Georgina House MD    Objective   /68   Ht 163.8 cm (64.5\")   Wt 57.3 kg (126 lb 6.4 oz)   LMP 03/18/2018   BMI 21.36 kg/mý     Physical Exam  Vitals and nursing note reviewed. Exam conducted with a chaperone present.   Constitutional:  "      General: She is awake.   HENT:      Head: Normocephalic and atraumatic.   Neck:      Thyroid: No thyroid mass, thyromegaly or thyroid tenderness.   Cardiovascular:      Rate and Rhythm: Normal rate.      Heart sounds: No murmur heard.    No friction rub. No gallop.   Pulmonary:      Effort: Pulmonary effort is normal.      Breath sounds: Normal breath sounds. No stridor. No wheezing, rhonchi or rales.   Chest:      Chest wall: No mass or tenderness.   Breasts:     Right: Normal. No bleeding, inverted nipple, mass, nipple discharge, skin change or tenderness.      Left: Normal. No bleeding, inverted nipple, mass, nipple discharge, skin change or tenderness.   Abdominal:      Palpations: Abdomen is soft.      Tenderness: There is no guarding or rebound.      Hernia: There is no hernia in the left inguinal area or right inguinal area.   Genitourinary:     General: Normal vulva.      Pubic Area: No rash.       Labia:         Right: No rash, tenderness, lesion or injury.         Left: No rash, tenderness, lesion or injury.       Urethra: No prolapse, urethral swelling or urethral lesion.      Vagina: No foreign body. No vaginal discharge, erythema, tenderness, bleeding or lesions.      Cervix: No cervical motion tenderness, discharge, friability, lesion, erythema or cervical bleeding.      Uterus: Normal. Not deviated, not enlarged, not fixed and not tender.       Adnexa: Right adnexa normal and left adnexa normal.        Right: No mass, tenderness or fullness.          Left: No mass, tenderness or fullness.        Rectum: No external hemorrhoid.   Musculoskeletal:      Cervical back: Normal range of motion.   Lymphadenopathy:      Upper Body:      Right upper body: No supraclavicular or axillary adenopathy.      Left upper body: No supraclavicular or axillary adenopathy.   Skin:     General: Skin is warm.   Neurological:      Mental Status: She is alert and oriented to person, place, and time.   Psychiatric:          Mood and Affect: Mood and affect normal.         Speech: Speech normal.       Assessment & Plan     Assessment     Problem List Items Addressed This Visit    None  Visit Diagnoses       Women's annual routine gynecological examination    -  Primary    Relevant Orders    LIQUID-BASED PAP SMEAR WITH HPV GENOTYPING REGARDLESS OF INTERPRETATION ([facility]ARA,[facility]AD)    Urinary frequency        Relevant Orders    POC Urinalysis Dipstick (Completed)    Urine Culture - Urine, Urine, Clean Catch    Breast cancer screening by mammogram        Relevant Orders    Mammo Screening Digital Tomosynthesis Bilateral With CAD    PMB (postmenopausal bleeding)        Relevant Orders    US Non-ob Transvaginal    US Non-ob Transvaginal    Osteoporosis screening        Relevant Orders    DEXA Bone Density Axial            Plan     Reviewed monthly self breast exams.  Instructed to call with lumps, pain, or breast discharge.  Yearly mammograms ordered.  Ordered mammogram today.  Osteoporosis screening ordered today.  Patient reporting a few episodes of pink-tinged noted only with wiping.  These are very intermittent and happen months apart.  She has had no heavy vaginal bleeding or spotting on her underwear.  She did undergo transvaginal ultrasound today which did show thickened endometrium with stripe measuring 4.8 mm.  I did discuss with her the recommendation for endometrial biopsy, however, she declines at this time.  Patient has previously undergone an endometrial biopsy and had severe pain.  For now we will plan to repeat her ultrasound in 3 months and follow her stripe and patient was counseled to call with any additional bleeding.  We also discussed that if she notices the bleeding with wiping to see if she can verify that it is vaginal bleeding.  She did have a urinalysis today that was negative for blood.  We did discuss that with any postmenopausal bleeding especially if the lining is thickening there are concerns for possible  endometrial cancer or precancerous lesions.  She expresses understanding of this.    Patient with urinary frequency.  Urinalysis was negative today and we will send culture to rule out infection.  Return in about 3 months (around 11/7/2023) for Recheck with u/s.     Georgina House MD  08/07/2023

## 2023-08-09 LAB
BACTERIA UR CULT: NO GROWTH
BACTERIA UR CULT: NORMAL

## 2023-08-10 LAB — REF LAB TEST METHOD: NORMAL

## 2023-11-20 ENCOUNTER — OFFICE VISIT (OUTPATIENT)
Dept: OBSTETRICS AND GYNECOLOGY | Facility: CLINIC | Age: 58
End: 2023-11-20
Payer: COMMERCIAL

## 2023-11-20 VITALS
SYSTOLIC BLOOD PRESSURE: 120 MMHG | BODY MASS INDEX: 21.61 KG/M2 | HEIGHT: 64 IN | WEIGHT: 126.6 LBS | DIASTOLIC BLOOD PRESSURE: 70 MMHG

## 2023-11-20 DIAGNOSIS — R93.89 THICKENED ENDOMETRIUM: ICD-10-CM

## 2023-11-20 DIAGNOSIS — N95.0 PMB (POSTMENOPAUSAL BLEEDING): Primary | ICD-10-CM

## 2023-11-20 NOTE — PROGRESS NOTES
Gynecologic Exam Note      CC: PMB    Subjective   HPI  Kayce Wing is a 58 y.o. female, , who presents for follow up on thickened endometrium with stripe on 2023 and PMB.Patient has had an U/S today to follow up on. Patient states she had some light bleeding in September lasting for not even a day only for a few hours.     She states she has experienced this problem for several months.  She describes the severity as mild.  She states that the problem is intermittent.  The patient reports additional symptoms as none.      Her last LMP was Patient's last menstrual period was 2018..  Periods are  absent secondary to postmenopause  , lasting 0 days.  Dysmenorrhea:none.  Patient reports problems with: none.  Partner Status: Marital Status: single.  New Partners since last visit: no.  Desires STD Screening: no.    Additional OB/GYN History   Current contraception: contraceptive methods: None  Desires to: do not start contraception  Last Pap : 2023 Negative HPV Negative   Last Completed Pap Smear            PAP SMEAR (Every 3 Years) Next due on 2023  LIQUID-BASED PAP SMEAR WITH HPV GENOTYPING REGARDLESS OF INTERPRETATION ([facility])    2018  Done - in [place] (negative)                  History of abnormal Pap smear: yes -  HPV   Last mammogram: 10/31/2022   Last Completed Mammogram            Ordered - MAMMOGRAM (Every 2 Years) Ordered on 2023  Mammo Screening Digital Tomosynthesis Bilateral With CAD    10/31/2022  Mammo Screening Digital Tomosynthesis Bilateral With CAD    10/28/2021  Mammo Screening Digital Tomosynthesis Bilateral With CAD    2021  SCANNED - MAMMO    10/22/2020  SCANNED - MAMMO    Only the first 5 history entries have been loaded, but more history exists.                  Tobacco Usage?: No   OB History          0    Para   0    Term   0       0    AB   0    Living   0         SAB   0    IAB   0  "   Ectopic   0    Molar        Multiple   0    Live Births                    Health Maintenance   Topic Date Due    COLORECTAL CANCER SCREENING  Never done    TDAP/TD VACCINES (1 - Tdap) Never done    ZOSTER VACCINE (1 of 2) Never done    HEPATITIS C SCREENING  Never done    ANNUAL PHYSICAL  Never done    INFLUENZA VACCINE  08/01/2023    COVID-19 Vaccine (6 - 2023-24 season) 09/01/2023    Annual Gynecologic Pelvic and Breast Exam  08/08/2024    MAMMOGRAM  11/02/2025    DXA SCAN  11/02/2025    PAP SMEAR  08/07/2026    Pneumococcal Vaccine 0-64  Aged Out       The additional following portions of the patient's history were reviewed and updated as appropriate: allergies, current medications, past family history, past medical history, past social history, past surgical history, and problem list.    Review of Systems   Genitourinary:  Positive for vaginal bleeding.   All other systems reviewed and are negative.      I have reviewed and agree with the HPI, ROS, and historical information as entered above. Georgina House MD    Objective   /70   Ht 163.8 cm (64.49\")   Wt 57.4 kg (126 lb 9.6 oz)   LMP 03/18/2018   BMI 21.40 kg/m²     Physical Exam  Vitals and nursing note reviewed.   Constitutional:       General: She is not in acute distress.     Appearance: Normal appearance.   HENT:      Head: Normocephalic and atraumatic.   Pulmonary:      Effort: Pulmonary effort is normal. No accessory muscle usage or respiratory distress.   Neurological:      Mental Status: She is alert and oriented to person, place, and time.   Psychiatric:         Mood and Affect: Mood and affect normal.         Speech: Speech normal.         Assessment & Plan     Assessment     Problem List Items Addressed This Visit    None  Visit Diagnoses       PMB (postmenopausal bleeding)    -  Primary    Thickened endometrium                  Plan     Return for for EMB or hysteroscopy and D&C.  Patient presents today for follow-up for prior " episode of postmenopausal bleeding.  Since her last exam, the patient reports a single episode of spotting that lasted 1 day.  She did have some pelvic pain that began just prior to the bleeding and resolved the following day.  This occurred in September.  She has had no further bleeding since that time.  She underwent transvaginal ultrasound today which showed a small retroverted uterus with a endometrial stripe of 5.0 mm which had slightly increased from 4.8 mm on prior exam.  Bilateral ovaries were normal.  We had previously held on endometrial biopsy as this procedure in years past has been extremely difficult for her.  We discussed that with continued bleeding and thickening of the lining we did need to proceed with pathologic evaluation.  We discussed endometrial biopsy in the office versus hysteroscopy and D&C in the OR.  She is considering these options and will let us know what she would like to do.      Georgina House MD  11/20/2023